# Patient Record
Sex: FEMALE | Race: WHITE | NOT HISPANIC OR LATINO | Employment: FULL TIME | URBAN - METROPOLITAN AREA
[De-identification: names, ages, dates, MRNs, and addresses within clinical notes are randomized per-mention and may not be internally consistent; named-entity substitution may affect disease eponyms.]

---

## 2017-07-28 ENCOUNTER — PROCEDURE VISIT (OUTPATIENT)
Dept: FAMILY MEDICINE CLINIC | Facility: CLINIC | Age: 42
End: 2017-07-28

## 2017-07-28 VITALS
HEIGHT: 64 IN | HEART RATE: 76 BPM | OXYGEN SATURATION: 99 % | DIASTOLIC BLOOD PRESSURE: 66 MMHG | WEIGHT: 102.4 LBS | SYSTOLIC BLOOD PRESSURE: 104 MMHG | BODY MASS INDEX: 17.48 KG/M2 | TEMPERATURE: 98.3 F

## 2017-07-28 DIAGNOSIS — L91.8 SKIN TAG: ICD-10-CM

## 2017-07-28 PROCEDURE — 11301 SHAVE SKIN LESION 0.6-1.0 CM: CPT | Performed by: FAMILY MEDICINE

## 2017-07-28 NOTE — PROGRESS NOTES
Subjective.../ HPI  I have reviewed the patient's medical history in detail and updated the computerized patient record.    Subjective: Jess Aguiar is a 41 y.o. female presents here today for-    skin tag removal (groin area)  no allergy to parvez. L buttox    Family History   Problem Relation Age of Onset   • Heart disease Father    • Alcohol abuse Maternal Uncle    • Alcohol abuse Paternal Uncle    • Alcohol abuse Other    • Cancer Other        Social History     Social History   • Marital status: Unknown     Spouse name: N/A   • Number of children: N/A   • Years of education: N/A     Occupational History   • Not on file.     Social History Main Topics   • Smoking status: Current Every Day Smoker   • Smokeless tobacco: Not on file   • Alcohol use Not on file   • Drug use: Not on file   • Sexual activity: Defer     Other Topics Concern   • Not on file     Social History Narrative       Review of Systems   Constitutional: Negative.    HENT: Negative.    Eyes: Negative.    Respiratory: Negative.    Cardiovascular: Negative.    Gastrointestinal: Negative.    Endocrine: Negative.    Genitourinary: Negative.    Musculoskeletal: Negative.    Skin: Negative.    Allergic/Immunologic: Negative.    Neurological: Negative.    Hematological: Negative.    Psychiatric/Behavioral: The patient is nervous/anxious.        Physical Exam    Biopsy  Date/Time: 7/28/2017 3:01 PM  Performed by: KEN GALLARDO  Authorized by: KEN GALLARDO   Preparation: Patient was prepped and draped in the usual sterile fashion.  Local anesthesia used: yes    Anesthesia:  Local anesthesia used: yes  Local Anesthetic: lidocaine 1% with epinephrine    Sedation:  Patient sedated: no  Comments: 3mm growth L buttox ,pgmentd removed with a scisoors and base hyfercated          Jess was seen today for skin tag removal.    Diagnoses and all orders for this visit:    Skin tag    Other orders  -     Biopsy      Requested Prescriptions      No  prescriptions requested or ordered in this encounter       Results Review:        No Follow-up on file.

## 2017-07-31 LAB
DX ICD CODE: NORMAL
DX ICD CODE: NORMAL
PATH REPORT.FINAL DX SPEC: NORMAL
PATH REPORT.GROSS SPEC: NORMAL
PATH REPORT.SITE OF ORIGIN SPEC: NORMAL
PATHOLOGIST NAME: NORMAL
PAYMENT PROCEDURE: NORMAL

## 2017-08-11 ENCOUNTER — TELEPHONE (OUTPATIENT)
Dept: FAMILY MEDICINE CLINIC | Facility: CLINIC | Age: 42
End: 2017-08-11

## 2017-08-11 NOTE — TELEPHONE ENCOUNTER
----- Message from Jonelle Kumar sent at 8/11/2017  3:56 PM EDT -----  Ph [phone]    Biopsy results     lov 7/28/17  Pt aware  will not be in the office until Tuesday

## 2017-12-28 ENCOUNTER — HOSPITAL ENCOUNTER (EMERGENCY)
Facility: HOSPITAL | Age: 42
Discharge: HOME | End: 2017-12-28
Attending: EMERGENCY MEDICINE | Admitting: EMERGENCY MEDICINE

## 2017-12-28 VITALS
OXYGEN SATURATION: 96 % | WEIGHT: 110 LBS | TEMPERATURE: 96.2 F | HEART RATE: 81 BPM | DIASTOLIC BLOOD PRESSURE: 62 MMHG | SYSTOLIC BLOOD PRESSURE: 117 MMHG | BODY MASS INDEX: 18.78 KG/M2 | HEIGHT: 64 IN | RESPIRATION RATE: 20 BRPM

## 2017-12-28 DIAGNOSIS — T40.1X1A ACCIDENTAL OVERDOSE OF HEROIN, INITIAL ENCOUNTER (HCC): Primary | ICD-10-CM

## 2017-12-28 PROCEDURE — 99284 EMERGENCY DEPT VISIT MOD MDM: CPT

## 2017-12-28 RX ORDER — ONDANSETRON 4 MG/1
4 TABLET, ORALLY DISINTEGRATING ORAL ONCE
Status: COMPLETED | OUTPATIENT
Start: 2017-12-28 | End: 2017-12-28

## 2017-12-28 RX ADMIN — ONDANSETRON 4 MG: 4 TABLET, ORALLY DISINTEGRATING ORAL at 02:57

## 2017-12-29 ENCOUNTER — TELEPHONE (OUTPATIENT)
Dept: SOCIAL WORK | Facility: HOSPITAL | Age: 42
End: 2017-12-29

## 2019-01-08 ENCOUNTER — OFFICE VISIT (OUTPATIENT)
Dept: INTERNAL MEDICINE | Facility: CLINIC | Age: 44
End: 2019-01-08

## 2019-01-08 VITALS
HEIGHT: 64 IN | BODY MASS INDEX: 18.13 KG/M2 | OXYGEN SATURATION: 96 % | WEIGHT: 106.2 LBS | HEART RATE: 92 BPM | TEMPERATURE: 98.1 F | DIASTOLIC BLOOD PRESSURE: 71 MMHG | SYSTOLIC BLOOD PRESSURE: 117 MMHG

## 2019-01-08 DIAGNOSIS — L57.0 ACTINIC KERATOSIS DUE TO EXPOSURE TO SUNLIGHT: ICD-10-CM

## 2019-01-08 DIAGNOSIS — Z00.00 PHYSICAL EXAM: Primary | ICD-10-CM

## 2019-01-08 DIAGNOSIS — L72.0 EPIDERMOID CYST OF SKIN: ICD-10-CM

## 2019-01-08 DIAGNOSIS — K59.01 SLOW TRANSIT CONSTIPATION: ICD-10-CM

## 2019-01-08 DIAGNOSIS — J00 ACUTE RHINITIS: ICD-10-CM

## 2019-01-08 LAB
25(OH)D3+25(OH)D2 SERPL-MCNC: 10.4 NG/ML (ref 30–100)
ALBUMIN SERPL-MCNC: 4.6 G/DL (ref 3.5–5.2)
ALBUMIN/GLOB SERPL: 1.6 G/DL
ALP SERPL-CCNC: 92 U/L (ref 39–117)
ALT SERPL-CCNC: 17 U/L (ref 1–33)
AST SERPL-CCNC: 20 U/L (ref 1–32)
BASOPHILS # BLD AUTO: 0.06 10*3/MM3 (ref 0–0.2)
BASOPHILS NFR BLD AUTO: 0.8 % (ref 0–1.5)
BILIRUB SERPL-MCNC: 0.3 MG/DL (ref 0.1–1.2)
BUN SERPL-MCNC: 11 MG/DL (ref 6–20)
BUN/CREAT SERPL: 16.7 (ref 7–25)
CALCIUM SERPL-MCNC: 9.9 MG/DL (ref 8.6–10.5)
CHLORIDE SERPL-SCNC: 100 MMOL/L (ref 98–107)
CHOLEST SERPL-MCNC: 288 MG/DL (ref 0–200)
CO2 SERPL-SCNC: 28.7 MMOL/L (ref 22–29)
CREAT SERPL-MCNC: 0.66 MG/DL (ref 0.57–1)
EOSINOPHIL # BLD AUTO: 0.43 10*3/MM3 (ref 0–0.7)
EOSINOPHIL NFR BLD AUTO: 5.7 % (ref 0.3–6.2)
ERYTHROCYTE [DISTWIDTH] IN BLOOD BY AUTOMATED COUNT: 14.1 % (ref 11.7–13)
GLOBULIN SER CALC-MCNC: 2.9 GM/DL
GLUCOSE SERPL-MCNC: 87 MG/DL (ref 65–99)
HCT VFR BLD AUTO: 44.9 % (ref 35.6–45.5)
HDLC SERPL-MCNC: 126 MG/DL (ref 40–60)
HGB BLD-MCNC: 14.4 G/DL (ref 11.9–15.5)
IMM GRANULOCYTES # BLD AUTO: 0.02 10*3/MM3 (ref 0–0.03)
IMM GRANULOCYTES NFR BLD AUTO: 0.3 % (ref 0–0.5)
LDLC SERPL CALC-MCNC: 147 MG/DL (ref 0–100)
LDLC/HDLC SERPL: 1.17 {RATIO}
LYMPHOCYTES # BLD AUTO: 2.4 10*3/MM3 (ref 0.9–4.8)
LYMPHOCYTES NFR BLD AUTO: 31.9 % (ref 19.6–45.3)
MCH RBC QN AUTO: 32.1 PG (ref 26.9–32)
MCHC RBC AUTO-ENTMCNC: 32.1 G/DL (ref 32.4–36.3)
MCV RBC AUTO: 100.2 FL (ref 80.5–98.2)
MONOCYTES # BLD AUTO: 0.74 10*3/MM3 (ref 0.2–1.2)
MONOCYTES NFR BLD AUTO: 9.8 % (ref 5–12)
NEUTROPHILS # BLD AUTO: 3.88 10*3/MM3 (ref 1.9–8.1)
NEUTROPHILS NFR BLD AUTO: 51.5 % (ref 42.7–76)
PLATELET # BLD AUTO: 278 10*3/MM3 (ref 140–500)
POTASSIUM SERPL-SCNC: 5.1 MMOL/L (ref 3.5–5.2)
PROT SERPL-MCNC: 7.5 G/DL (ref 6–8.5)
RBC # BLD AUTO: 4.48 10*6/MM3 (ref 3.9–5.2)
SODIUM SERPL-SCNC: 138 MMOL/L (ref 136–145)
TRIGL SERPL-MCNC: 76 MG/DL (ref 0–150)
TSH SERPL DL<=0.005 MIU/L-ACNC: 1.2 MIU/ML (ref 0.27–4.2)
VLDLC SERPL CALC-MCNC: 15.2 MG/DL (ref 5–40)
WBC # BLD AUTO: 7.53 10*3/MM3 (ref 4.5–10.7)

## 2019-01-08 PROCEDURE — 90471 IMMUNIZATION ADMIN: CPT | Performed by: FAMILY MEDICINE

## 2019-01-08 PROCEDURE — 93000 ELECTROCARDIOGRAM COMPLETE: CPT | Performed by: FAMILY MEDICINE

## 2019-01-08 PROCEDURE — 99214 OFFICE O/P EST MOD 30 MIN: CPT | Performed by: FAMILY MEDICINE

## 2019-01-08 PROCEDURE — 90715 TDAP VACCINE 7 YRS/> IM: CPT | Performed by: FAMILY MEDICINE

## 2019-01-08 NOTE — PROGRESS NOTES
CC:PAP,constipation,irritated nose    Subjective.../HPI  Patient present today with1) PAP- last2-3 yrs  2) constipation due to narcotic abuse  3)irritayted noise due to snorting drugs    I have reviewed the patient's medical history in detail and updated the computerized patient record.    Past Medical History:   Diagnosis Date   •      Number of Abortions: 1   at 18 years old   • Birth control     Birth control method: Condoms.   • Bloody stools     Bloody or tarry stools sometimes   • Chicken pox     as a child   • Depression     since going off controlled drugs   • Dizzy spells    • Flushing     Flushing / Menopause premenopause.   • Hearing problem     Hearing problems   • Herpes    • History of mammogram     mammogram . Mammogram Abnormal had a biopsy/ neg.   • Last menstrual period (LMP) > 10 days ago     Date -  day of last period:    • Moodiness    • Pain     Pain / Bleeding during or after sex uncomforable during at times   • Painful menstrual flow     Pain/Cramps with menstrual flow. Days of flow: 6 days.   • Palpitations     when waking up   • Pregnancy     Number of Pregnancies: 1   • Sleep trouble     Sleep problems   • UTI (urinary tract infection)    • Vision problems        Past Surgical History:   Procedure Laterality Date   • BREAST BIOPSY     • WISDOM TOOTH EXTRACTION  1993    wisdom teeth       Family History   Problem Relation Age of Onset   • Heart disease Father    • Alcohol abuse Maternal Uncle    • Alcohol abuse Paternal Uncle    • Alcohol abuse Other    • Cancer Other        Social History     Socioeconomic History   • Marital status:      Spouse name: Not on file   • Number of children: Not on file   • Years of education: Not on file   • Highest education level: Not on file   Social Needs   • Financial resource strain: Not on file   • Food insecurity - worry: Not on file   • Food insecurity - inability: Not on file   • Transportation needs - medical:  Not on file   • Transportation needs - non-medical: Not on file   Occupational History   • Not on file   Tobacco Use   • Smoking status: Current Every Day Smoker   Substance and Sexual Activity   • Alcohol use: Not on file   • Drug use: Not on file   • Sexual activity: Defer   Other Topics Concern   • Not on file   Social History Narrative   • Not on file       Most Recent Immunizations   Administered Date(s) Administered   • Tdap 08/14/2013       Review of Systems:   Review of Systems   Constitutional: Negative.    HENT:        Biklat sore nose-neosporin no help   Eyes: Positive for itching.   Respiratory: Negative.    Cardiovascular: Negative.    Gastrointestinal: Positive for constipation and rectal pain.   Endocrine: Negative.    Genitourinary: Negative.    Musculoskeletal: Negative.    Skin: Negative.    Allergic/Immunologic: Negative.    Neurological: Negative.    Hematological: Negative.    Psychiatric/Behavioral:        Off narcotics and taking suboxone-weaning down         Physical Exam   Constitutional: She is oriented to person, place, and time. She appears well-developed and well-nourished.   HENT:   Head: Normocephalic and atraumatic.   Right Ear: External ear normal.   Left Ear: External ear normal.   Nose: Nose normal.   Mouth/Throat: Oropharynx is clear and moist.   Eyes: Conjunctivae and EOM are normal. Pupils are equal, round, and reactive to light. Right eye exhibits no discharge. Left eye exhibits discharge. Scleral icterus is present.   Neck: Normal range of motion. Neck supple. No JVD present. No tracheal deviation present. No thyromegaly present.   Cardiovascular: Normal rate, regular rhythm, normal heart sounds and intact distal pulses. Exam reveals no gallop and no friction rub.   No murmur heard.  Pulmonary/Chest: Effort normal and breath sounds normal. No stridor. No respiratory distress. She has no wheezes. She has no rales. She exhibits no tenderness.   Abdominal: Bowel sounds are  "normal. She exhibits no distension and no mass. There is no tenderness. There is no rebound and no guarding. No hernia.   Genitourinary: Uterus normal. No vaginal discharge found.   Musculoskeletal: She exhibits tenderness and deformity. She exhibits no edema.   Lymphadenopathy:     She has no cervical adenopathy.   Neurological: She is alert and oriented to person, place, and time. She displays normal reflexes. No cranial nerve deficit or sensory deficit. She exhibits normal muscle tone. Coordination normal.   Skin:   bilat groin with 5mm epidermoid cysts  Ant chest with 1 cm2 tred scaley lesion   Psychiatric: She has a normal mood and affect. Her behavior is normal. Judgment and thought content normal.   Vitals reviewed.        Vital Signs     Vitals:    01/08/19 0836   BP: 117/71   BP Location: Left arm   Patient Position: Sitting   Cuff Size: Small Adult   Pulse: 92   Temp: 98.1 °F (36.7 °C)   TempSrc: Oral   SpO2: 96%   Weight: 48.2 kg (106 lb 3.2 oz)   Height: 162.6 cm (64\")          Results Review:      REVIEWED AND DISCUSSED CLINICAL RESULTS WITH PATIENT      Requested Prescriptions     Signed Prescriptions Disp Refills   • mupirocin (BACTROBAN NASAL) 2 % nasal ointment 10 g 6     Sig: into the nostril(s) as directed by provider 2 (Two) Times a Day.         Current Outpatient Medications:   •  buprenorphine-naloxone (SUBOXONE) 8-2 MG per SL tablet, Place 1 tablet under the tongue Daily., Disp: , Rfl:   •  mupirocin (BACTROBAN NASAL) 2 % nasal ointment, into the nostril(s) as directed by provider 2 (Two) Times a Day., Disp: 10 g, Rfl: 6    Procedures          Diagnoses and all orders for this visit:    Physical exam  -     Cancel: Liquid-based Pap Smear, Diagnostic  -     mupirocin (BACTROBAN NASAL) 2 % nasal ointment; into the nostril(s) as directed by provider 2 (Two) Times a Day.  -     Tdap Vaccine Greater Than or Equal To 8yo IM  -     Mammo Diagnostic Bilateral With CAD; Future  -     TSH  -     Vitamin " D 25 Hydroxy  -     CBC & Differential  -     Comprehensive Metabolic Panel  -     Lipid Panel With LDL / HDL Ratio    Slow transit constipation    Actinic keratosis due to exposure to sunlight    Epidermoid cyst of skin    Acute rhinitis         Return in about 3 months (around 4/8/2019) for Recheck.  Citrucel and Miralax  Stop Neosporin in nose and use Bactroban  Kevin Shukla M.D  01/08/19  9:59 AM

## 2019-01-09 LAB
CYTOLOGIST CVX/VAG CYTO: NORMAL
CYTOLOGY CVX/VAG DOC THIN PREP: NORMAL
DX ICD CODE: NORMAL
HIV 1 & 2 AB SER-IMP: NORMAL
OTHER STN SPEC: NORMAL
PATH REPORT.FINAL DX SPEC: NORMAL
STAT OF ADQ CVX/VAG CYTO-IMP: NORMAL

## 2019-01-17 RX ORDER — ERGOCALCIFEROL 1.25 MG/1
50000 CAPSULE ORAL WEEKLY
Qty: 20 CAPSULE | Refills: 1 | Status: SHIPPED | OUTPATIENT
Start: 2019-01-17

## 2019-02-19 ENCOUNTER — HOSPITAL ENCOUNTER (OUTPATIENT)
Dept: ULTRASOUND IMAGING | Facility: HOSPITAL | Age: 44
Discharge: HOME | End: 2019-02-19

## 2019-02-19 ENCOUNTER — HOSPITAL ENCOUNTER (OUTPATIENT)
Dept: MAMMOGRAPHY | Facility: HOSPITAL | Age: 44
Discharge: HOME | End: 2019-02-19
Attending: FAMILY MEDICINE | Admitting: FAMILY MEDICINE

## 2019-02-19 DIAGNOSIS — Z00.00 PHYSICAL EXAM: ICD-10-CM

## 2019-02-19 PROCEDURE — 76641 ULTRASOUND BREAST COMPLETE: CPT

## 2019-02-19 PROCEDURE — 77066 DX MAMMO INCL CAD BI: CPT

## 2019-02-20 ENCOUNTER — APPOINTMENT (OUTPATIENT)
Dept: MAMMOGRAPHY | Facility: HOSPITAL | Age: 44
End: 2019-02-20
Attending: FAMILY MEDICINE

## 2019-04-09 ENCOUNTER — OFFICE VISIT (OUTPATIENT)
Dept: INTERNAL MEDICINE | Facility: CLINIC | Age: 44
End: 2019-04-09

## 2019-04-09 VITALS
BODY MASS INDEX: 18.1 KG/M2 | OXYGEN SATURATION: 100 % | SYSTOLIC BLOOD PRESSURE: 116 MMHG | HEIGHT: 64 IN | DIASTOLIC BLOOD PRESSURE: 76 MMHG | TEMPERATURE: 97.3 F | HEART RATE: 71 BPM | WEIGHT: 106 LBS

## 2019-04-09 DIAGNOSIS — E55.9 VITAMIN D DEFICIENCY: ICD-10-CM

## 2019-04-09 DIAGNOSIS — K59.01 SLOW TRANSIT CONSTIPATION: Primary | ICD-10-CM

## 2019-04-09 DIAGNOSIS — L72.0 EPIDERMOID CYST OF SKIN: ICD-10-CM

## 2019-04-09 PROCEDURE — 99213 OFFICE O/P EST LOW 20 MIN: CPT | Performed by: FAMILY MEDICINE

## 2019-04-09 RX ORDER — CEPHALEXIN 500 MG/1
500 CAPSULE ORAL 3 TIMES DAILY
Qty: 30 CAPSULE | Refills: 0 | Status: SHIPPED | OUTPATIENT
Start: 2019-04-09 | End: 2019-05-01

## 2019-04-09 NOTE — PROGRESS NOTES
CC:constipation,epidermoid cyst,vit. D deficiency    Subjective.../HPI  Patient present today with1_ vit D deficiency on 50,000 units Vitamin D for 3 mons  2) constipation -good with citrucel and Miralax  3) epidermoid cyst irritated  I have reviewed the patient's medical history in detail and updated the computerized patient record.    Past Medical History:   Diagnosis Date   •      Number of Abortions: 1   at 18 years old   • Birth control     Birth control method: Condoms.   • Bloody stools     Bloody or tarry stools sometimes   • Chicken pox     as a child   • Depression     since going off controlled drugs   • Dizzy spells    • Flushing     Flushing / Menopause premenopause.   • Hearing problem     Hearing problems   • Herpes    • History of mammogram     mammogram . Mammogram Abnormal had a biopsy/ neg.   • Last menstrual period (LMP) > 10 days ago     Date -  day of last period:    • Moodiness    • Pain     Pain / Bleeding during or after sex uncomforable during at times   • Painful menstrual flow     Pain/Cramps with menstrual flow. Days of flow: 6 days.   • Palpitations     when waking up   • Pregnancy     Number of Pregnancies: 1   • Sleep trouble     Sleep problems   • UTI (urinary tract infection)    • Vision problems        Past Surgical History:   Procedure Laterality Date   • BREAST BIOPSY     • WISDOM TOOTH EXTRACTION  1993    wisdom teeth       Family History   Problem Relation Age of Onset   • Heart disease Father    • Alcohol abuse Maternal Uncle    • Alcohol abuse Paternal Uncle    • Alcohol abuse Other    • Cancer Other    • Breast cancer Maternal Aunt    • Breast cancer Paternal Aunt        Social History     Socioeconomic History   • Marital status:      Spouse name: Not on file   • Number of children: Not on file   • Years of education: Not on file   • Highest education level: Not on file   Tobacco Use   • Smoking status: Current Every Day Smoker  "  Substance and Sexual Activity   • Sexual activity: Defer       Most Recent Immunizations   Administered Date(s) Administered   • Tdap 01/08/2019       Review of Systems:   Review of Systems   Constitutional: Negative.    HENT: Negative.    Eyes: Negative.    Respiratory: Negative.    Cardiovascular: Negative.    Gastrointestinal: Positive for constipation and diarrhea.   Endocrine: Negative.    Genitourinary: Negative.    Musculoskeletal: Negative.    Skin:        bilat groin with tender epidermoid cysts   Allergic/Immunologic: Negative.    Neurological: Negative.    Hematological: Negative.    Psychiatric/Behavioral: Negative.          Physical Exam   Constitutional: She is oriented to person, place, and time. She appears well-developed and well-nourished.   Cardiovascular: Normal rate and regular rhythm.   Pulmonary/Chest: Effort normal and breath sounds normal.   Neurological: She is alert and oriented to person, place, and time.   Skin:   bilat groin eidermoid cysts   Psychiatric: She has a normal mood and affect. Her behavior is normal.   Vitals reviewed.        Vital Signs     Vitals:    04/09/19 0938   BP: 116/76   BP Location: Left arm   Patient Position: Sitting   Cuff Size: Small Adult   Pulse: 71   Temp: 97.3 °F (36.3 °C)   TempSrc: Oral   SpO2: 100%   Weight: 48.1 kg (106 lb)   Height: 162.6 cm (64\")          Results Review:      REVIEWED AND DISCUSSED CLINICAL RESULTS WITH PATIENT      Requested Prescriptions     Signed Prescriptions Disp Refills   • cephalexin (KEFLEX) 500 MG capsule 30 capsule 0     Sig: Take 1 capsule by mouth 3 (Three) Times a Day.         Current Outpatient Medications:   •  buprenorphine-naloxone (SUBOXONE) 8-2 MG per SL tablet, Place 1 tablet under the tongue Daily., Disp: , Rfl:   •  vitamin D (ERGOCALCIFEROL) 05983 units capsule capsule, Take 1 capsule by mouth 1 (One) Time Per Week., Disp: 20 capsule, Rfl: 1  •  cephalexin (KEFLEX) 500 MG capsule, Take 1 capsule by mouth 3 " (Three) Times a Day., Disp: 30 capsule, Rfl: 0  •  mupirocin (BACTROBAN NASAL) 2 % nasal ointment, into the nostril(s) as directed by provider 2 (Two) Times a Day., Disp: 10 g, Rfl: 6    Procedures          Diagnoses and all orders for this visit:    Slow transit constipation    Epidermoid cyst of skin  -     cephalexin (KEFLEX) 500 MG capsule; Take 1 capsule by mouth 3 (Three) Times a Day.    Vitamin D deficiency  -     Vitamin D 25 Hydroxy        There are no Patient Instructions on file for this visit.     Return if symptoms worsen or fail to improve.    Kevin Shukla M.D  04/09/19  10:38 AM

## 2019-04-10 LAB — 25(OH)D3+25(OH)D2 SERPL-MCNC: 35.7 NG/ML (ref 30–100)

## 2019-04-11 ENCOUNTER — TELEPHONE (OUTPATIENT)
Dept: INTERNAL MEDICINE | Facility: CLINIC | Age: 44
End: 2019-04-11

## 2019-04-22 ENCOUNTER — TRANSCRIBE ORDERS (OUTPATIENT)
Dept: ADMINISTRATIVE | Facility: HOSPITAL | Age: 44
End: 2019-04-22

## 2019-04-22 DIAGNOSIS — R22.42 MASS OF THIGH, LEFT: Primary | ICD-10-CM

## 2019-05-01 ENCOUNTER — HOSPITAL ENCOUNTER (OUTPATIENT)
Dept: INFUSION THERAPY | Facility: HOSPITAL | Age: 44
Discharge: HOME | End: 2019-05-01
Admitting: SPECIALIST

## 2019-05-01 VITALS
RESPIRATION RATE: 16 BRPM | HEIGHT: 64 IN | DIASTOLIC BLOOD PRESSURE: 62 MMHG | BODY MASS INDEX: 18.1 KG/M2 | WEIGHT: 106 LBS | SYSTOLIC BLOOD PRESSURE: 124 MMHG | OXYGEN SATURATION: 100 % | HEART RATE: 85 BPM | TEMPERATURE: 97.2 F

## 2019-05-01 DIAGNOSIS — IMO0002 MASS: Primary | ICD-10-CM

## 2019-05-01 PROCEDURE — 88305 TISSUE EXAM BY PATHOLOGIST: CPT | Performed by: SPECIALIST

## 2019-05-01 RX ORDER — LIDOCAINE HYDROCHLORIDE AND EPINEPHRINE 10; 10 MG/ML; UG/ML
20 INJECTION, SOLUTION INFILTRATION; PERINEURAL ONCE
Status: COMPLETED | OUTPATIENT
Start: 2019-05-01 | End: 2019-05-01

## 2019-05-01 RX ADMIN — LIDOCAINE HYDROCHLORIDE AND EPINEPHRINE 5 ML: 10; 10 INJECTION, SOLUTION INFILTRATION; PERINEURAL at 09:11

## 2019-05-01 NOTE — PATIENT INSTRUCTIONS
MINOR SURGERY DISCHARGE INSTRUCTIONS    IMPORTANT:  The following information will help you return to your best level of health.    Wound Care:  ·  Your dressing may be removed:   ·   Leave dressing on for doctor to remove when you see him at his office.  · Allow steri-strips to come off as you bathe/shower.  Steri-strips will likely have a red or brown blood stain on them.  The incision will still be secure.    · No cosmetics to incision.  · You may use band aids after dressing is removed.  · Avoid stooping over or heavy lifting.    You may shower:  · Tomorrow  · Do not get steri-strips soaking wet.  · Apply ice to area 20 minutes on and off for the rest of the day and possibly the next day if helpful.  · Elevate area for 24-48 hours to reduce swelling.      Medications:   Following this procedure, you should continue to take all other medications as directed by your primary physician unless otherwise instructed. There have been no changes to the medications you provided us.   You may use Tylenol or Advil for discomfort.    Activity:   You may increase your activity as tolerated.   You may resume normal activity: Today    No strenuous activity, lifting or sports: Until seen by your Dr.    Call your doctor to make an appointment for:     On (date) 5/7/19  Dr. Tate     Office # [phone]      Incision Care, Adult  An incision is a surgical cut that is made through your skin. Most incisions are closed after surgery. Your incision may be closed with stitches (sutures), staples, skin glue, or adhesive strips. You may need to return to your health care provider to have sutures or staples removed. This may occur several days to several weeks after your surgery. The incision needs to be cared for properly to prevent infection.  How to care for your incision  Incision care    · Follow instructions from your health care provider about how to take care of your incision. Make sure you:  ? Wash your hands with soap and water  before you change the bandage (dressing). If soap and water are not available, use hand .  ? Change your dressing as told by your health care provider.  ? Leave sutures, skin glue, or adhesive strips in place. These skin closures may need to stay in place for 2 weeks or longer. If adhesive strip edges start to loosen and curl up, you may trim the loose edges. Do not remove adhesive strips completely unless your health care provider tells you to do that.  · Check your incision area every day for signs of infection. Check for:  ? More redness, swelling, or pain.  ? More fluid or blood.  ? Warmth.  ? Pus or a bad smell.  · Ask your health care provider how to clean the incision. This may include:  ? Using mild soap and water.  ? Using a clean towel to pat the incision dry after cleaning it.  ? Applying a cream or ointment. Do this only as told by your health care provider.  ? Covering the incision with a clean dressing.  · Ask your health care provider when you can leave the incision uncovered.  · Do not take baths, swim, or use a hot tub until your health care provider approves. Ask your health care provider if you can take showers. You may only be allowed to take sponge baths for bathing.  Medicines  · If you were prescribed an antibiotic medicine, cream, or ointment, take or apply the antibiotic as told by your health care provider. Do not stop taking or applying the antibiotic even if your condition improves.  · Take over-the-counter and prescription medicines only as told by your health care provider.  General instructions  · Limit movement around your incision to improve healing.  ? Avoid straining, lifting, or exercise for the first month, or for as long as told by your health care provider.  ? Follow instructions from your health care provider about returning to your normal activities.  ? Ask your health care provider what activities are safe.  · Protect your incision from the sun when you are outside  for the first 6 months, or for as long as told by your health care provider. Apply sunscreen around the scar or cover it up.  · Keep all follow-up visits as told by your health care provider. This is important.  Contact a health care provider if:  · Your have more redness, swelling, or pain around the incision.  · You have more fluid or blood coming from the incision.  · Your incision feels warm to the touch.  · You have pus or a bad smell coming from the incision.  · You have a fever or shaking chills.  · You are nauseous or you vomit.  · You are dizzy.  · Your sutures or staples come undone.  Get help right away if:  · You have a red streak coming from your incision.  · Your incision bleeds through the dressing and the bleeding does not stop with gentle pressure.  · The edges of your incision open up and separate.  · You have severe pain.  · You have a rash.  · You are confused.  · You faint.  · You have trouble breathing and a fast heartbeat.  This information is not intended to replace advice given to you by your health care provider. Make sure you discuss any questions you have with your health care provider.  Document Released: 07/07/2006 Document Revised: 08/25/2017 Document Reviewed: 07/05/2017  [employer] Interactive Patient Education © 2019 [employer]

## 2019-05-02 LAB
CYTO UR: NORMAL
LAB AP CASE REPORT: NORMAL
LAB AP CLINICAL INFORMATION: NORMAL
PATH REPORT.FINAL DX SPEC: NORMAL
PATH REPORT.GROSS SPEC: NORMAL

## 2022-01-07 ENCOUNTER — APPOINTMENT (OUTPATIENT)
Dept: WOMENS IMAGING | Facility: HOSPITAL | Age: 47
End: 2022-01-07

## 2022-01-07 PROCEDURE — 77063 BREAST TOMOSYNTHESIS BI: CPT | Performed by: RADIOLOGY

## 2022-01-07 PROCEDURE — 77067 SCR MAMMO BI INCL CAD: CPT | Performed by: RADIOLOGY

## 2022-03-17 ENCOUNTER — PRE-PROCEDURE SCREENING (OUTPATIENT)
Dept: GASTROENTEROLOGY | Facility: CLINIC | Age: 47
End: 2022-03-17

## 2024-11-02 NOTE — OP NOTE
"INTENSIVIST - PROGRESS NOTE     Hospital:  LOS: 0 days     Mr. Luis Bloom, 61 y.o. male is followed for:  Moderate right pneumothorax with surgical dehiscence/ right chest wall defect    As an Intensivist, we provide an integrated approach to the ICU patient and family, medical management of comorbid conditions, lead interdisciplinary rounds and coordinate the care with all other services, including those from other specialists.    Regina Bloom is a 62 yo  male who presented to [facility] ED last night c/o coughing up blood and shortness of air starting PTA. He has a h/o squamous cell carcinoma s/p right upper lobectomy per Dr De La Fuente on 12/29/2016.       He was discharged on 01/05/2017 and that evening he had presented to [facility] ED for SQ air of the face, neck, and chest.  He was watched over night and NV'ed the next day.  He was doing well since that time until he coughed up small amount of blood \"a few times\" and knew something was wrong.     CT Chest revealed dehiscence of prior surgical site with a large right chest wall defect; moderate right pneumonthorax with subcutaneous air and mild pneumomediastinum.  Dr De La Fuente was called by the ED physician and accepted the patient for transfer to [facility] ICU admission.    Past medical History:  Mr.Arlie ANTHONY Bloom  has a past medical history of Anxiety; Arthritis; Brain aneurysm; CAD (coronary artery disease); Dyslipidemia; GERD (gastroesophageal reflux disease); Hiatal hernia; [place] (hard of hearing); Hypertension; Lung nodule; Nephrolithiasis; Obesity; Squamous cell lung cancer; and Wears dentures.    Past Surgical History:  He  has a past surgical history that includes Other surgical history; Appendectomy; Coronary stent placement; Cerebral aneurysm repair (Right); orthopedic surgery; Kidney stone surgery; bronchoscopy thoracotomy (Right, 12/29/2016); and Lung lobectomy (Right, 12/29/2016).    Home Medications:  Medication " Discussed with Mom he does have a RLL pneumonia.  Will treat with zithromax and mucinex.  We discussed symptomatic care as well.  Return if not improving.  Follow up Xray in 6 weeks.   Patient Care Team:  Kevin Shukla MD as PCP - General    Operation: Excision 1.1 cm cyst left proximal medial thigh with a 3.4 cm complex repair.    Pre-Op Diagnosis: Cyst left proximal medial thigh.    Post-Op Diagnosis: Same.    Surgeon:  Tao Tate Jr., MD    Assistant: None.    Anesthesia: 5 cc of 1% lidocaine with epinephrine 1-100,000.    Indications: This 43-year-old white female presented with an enlarged cystic mass of the left proximal medial thigh that was in great measure responsive to antibiotic coverage but still left her with a small mass listed above.  I recommended to her that this be removed so that recurrence will be minimized and she agrees.  The procedure, expectations, the possibility of infection, bleeding, scar deformity, asymmetry, recurrence, and other risks, benefits, alternatives, and possible complications of the procedure, were explained to the patient's apparent satisfaction and she wishes us to proceed.      Narrative: On May 1, 2019 the patient was placed in the semi-recumbent position in treatment room 11 of the ambulatory care unit.  The left leg was frog legged to allow exposure of the area and the area was marked out elliptically and measured length and width.  The area was then infiltrated with 5 cc of 1% lidocaine with epinephrine 1-100,000 and the patient was prepped and draped.  An elliptical removal with a 15 blade was made in the suture placed at the most proximal part of the take out and identified as the 12 o'clock position.  Lesion was then removed and sent to pathology for permanent section analysis.  Hemostasis was effected with a Hyfrecator and the wound was undermined in every direction with the spreading scissor technique.  The wound was irrigated with saline and after a final inspection for hemostasis which was effected with the Hyfrecator, wound closure was effected in layers with simple inverted interrupted sutures of 3-0 Vicryl for deep and dermal  "Sig   • amLODIPine (NORVASC) 5 MG tablet Take 5 mg by mouth Daily.   • aspirin 81 MG EC tablet Take 81 mg by mouth Daily.   • atenolol (TENORMIN) 25 MG tablet Take 25 mg by mouth Daily.   • atorvastatin (LIPITOR) 40 MG tablet Take 40 mg by mouth Daily.   • hydrochlorothiazide (HYDRODIURIL) 25 MG tablet Take 25 mg by mouth Daily.   • HYDROcodone-acetaminophen (NORCO)  MG per tablet Take  tablets by mouth Every 8 (Eight) Hours As Needed for moderate pain (4-6).   • Ibuprofen (ADVIL) 200 MG capsule Take 400 mg by mouth 3 (Three) Times a Day As Needed (Arthritis pain).   • meloxicam (MOBIC) 15 MG tablet Take 15 mg by mouth Daily.   • raNITIdine (ZANTAC) 300 MG tablet Take 300 mg by mouth 2 (Two) Times a Day.       Allergies:  He is allergic to fish-derived products.    Family History:  His family history includes Coronary artery disease in his father; Diabetes in his mother; Hyperlipidemia in his father; Hypertension in his father; Lung cancer in his mother.    Social History:  He  reports that he quit smoking about 9 years ago. His smoking use included Cigarettes. He has a 90.00 pack-year smoking history. His smokeless tobacco use includes Chew. He reports that he drinks alcohol. He reports that he does not use illicit drugs.    The patient's relevant pst medical, surgical and social history were reviewed and updated in Epic as appropriate.    Review of Systems  As described in the HPI. Otherwise rest of ROS (14 systems) were negative.    Objective   O     Visit Vitals   • /82   • Pulse 92   • Temp 98.5 °F (36.9 °C) (Oral)   • Resp 18   • Ht 68\" (172.7 cm)   • Wt 238 lb (108 kg)   • SpO2 96%   • BMI 36.19 kg/m2       Medications (drips):    dextrose 5 % and sodium chloride 0.9 %     Physical Examination    Telemetry:  Sinus Rhythm: normal sinus rhythm   Constitutional:  No acute distress.   HEENT: Normocephalic and atraumatic. PERRLA   Cardiovascular: Normal rate, regular and rhythm. Normal heart " approximation followed by a running intracuticular 3-0 Monocryl for skin approximation.  The wound was cleansed and appropriate dressings were applied.  Sharp counts were correct.  The patient was discharged in satisfactory condition and will make an appointment to be followed up in the office Tuesday, May 7, 2019.  She is to call the office to make that appointment.  No prescriptions were given.  The patient was advised to use ibuprofen and cool compresses for any discomfort.    Drains: None.    Estimated Blood Loss: Minimal.    Complications: None.    Tao Tate Jr., MD  05/01/19  9:37 AM     sounds.  No murmurs, gallop or rub.  No peripheral edema.   Respiratory: No respiratory distress. Normal respiratory effort.  Normal breath sounds  Clear to ascultation and percussion.  Skin incision from Right thoracotomy is closed. However the soft tissues are moving in & out, with breathing. There is also a noise coming from that site, all suggesting presence of an air leak.  Also subcutaneous crepitus.    Abdominal:  Soft. Obese. Non-tender. No distension. No HSM.   Extremities: No digital cyanosis. No clubbing. Bilateral LE edema.   Neurological:   Alert and Oriented to person, place, and time.  GCS 15   Psychiatric:  Normal affect. Normal behavior.       Estimated Creatinine Clearance: 90 mL/min (by C-G formula based on Cr of 1).      I reviewed the patient's new laboratory and imaging results.  I independently reviewed the patient's new images.    Images:  CXR: 1/14/2017 2:36 AM Right PTX and SQ Emphysema. Probable herniation of lung between Right 5th and 6 th rib.    Assessment/Plan   A / P     61 y.o.male, admitted on 1/14/2017 with:    1.  Right Pneumothorax, Subcutaneous emphysema and probable herniation of lung between right 5th and 6th rib.  2. S/P Right Upper Lobectomy 12/29/2017 for lung cancer (NSCLC: Squamous cell carcinoma; 5.5 X 5.5 X 5.3 cm necrotic tumor, pT 2b, IIA).  1. In addition, caseating and calcified granulomas were noticed in the pathology report.  3. HTN  4. Dyslipidemia  5. CAD  6. Former smoker, quit 2008  7. T2DM      Assessment / Plan:    1. Blood cultures  2. Empiric abs  3. Glucose control  4. Awaiting Dr De La Fuente to see definitive therapy regarding right pneumothorax and prob. lung herniation.    Plan of care and goals reviewed during interdisciplinary rounds.  I discussed the patient's findings and my recommendations with patient, family and nursing staff    I have personally seen, interviewed and examined the patient and verified all the key components of the history, physical  examination, assessment and plan with JUSTIN Alcazar and reviewed the note, which reflects my changes and contributions.      Lew Yepez MD, FACP, FCCP, [facility]  Intensive Care Medicine  January 14, 2017 5:34 AM

## 2025-02-03 ENCOUNTER — APPOINTMENT (OUTPATIENT)
Dept: WOMENS IMAGING | Facility: HOSPITAL | Age: 50
End: 2025-02-03
Payer: COMMERCIAL

## 2025-02-03 PROCEDURE — 76642 ULTRASOUND BREAST LIMITED: CPT | Performed by: RADIOLOGY

## 2025-02-03 PROCEDURE — 77065 DX MAMMO INCL CAD UNI: CPT | Performed by: RADIOLOGY

## 2025-02-03 PROCEDURE — 77061 BREAST TOMOSYNTHESIS UNI: CPT | Performed by: RADIOLOGY

## 2025-02-03 PROCEDURE — G0279 TOMOSYNTHESIS, MAMMO: HCPCS | Performed by: RADIOLOGY

## 2025-02-19 ENCOUNTER — LAB REQUISITION (OUTPATIENT)
Dept: LAB | Facility: HOSPITAL | Age: 50
End: 2025-02-19
Payer: COMMERCIAL

## 2025-02-19 ENCOUNTER — APPOINTMENT (OUTPATIENT)
Dept: WOMENS IMAGING | Facility: HOSPITAL | Age: 50
End: 2025-02-19
Payer: COMMERCIAL

## 2025-02-19 DIAGNOSIS — N63.0 UNSPECIFIED LUMP IN UNSPECIFIED BREAST: ICD-10-CM

## 2025-02-19 PROCEDURE — A4648 IMPLANTABLE TISSUE MARKER: HCPCS | Performed by: RADIOLOGY

## 2025-02-19 PROCEDURE — 19083 BX BREAST 1ST LESION US IMAG: CPT | Performed by: RADIOLOGY

## 2025-02-19 PROCEDURE — 88305 TISSUE EXAM BY PATHOLOGIST: CPT | Performed by: STUDENT IN AN ORGANIZED HEALTH CARE EDUCATION/TRAINING PROGRAM

## 2025-02-20 LAB
CYTO UR: NORMAL
DX PRELIMINARY: NORMAL
LAB AP CASE REPORT: NORMAL
LAB AP CLINICAL INFORMATION: NORMAL
PATH REPORT.FINAL DX SPEC: NORMAL
PATH REPORT.GROSS SPEC: NORMAL